# Patient Record
(demographics unavailable — no encounter records)

---

## 2017-11-28 NOTE — DIS
Mr. Alberto underwent transesophageal echo today by Dr. Laurent.  He said there is a communication
 of 2 mm around the Amulet device and spoke with Dr. Arzola.  He recommended not cardioverting the chandrakant
ent at the present time, we will stop aspirin and resume apixaban and Dr. Arzola said he will arrange f
or followup with Dr. Jackson.

## 2017-11-28 NOTE — ECHO
TRANSESOPHAGEAL ECHOCARDIOGRAM:

 

DATE OF PROCEDURE:  11/28/17 

 

INDICATION:

Paroxysmal atrial fibrillation. 

 

DESCRIPTION OF PROCEDURE:

The patient was taken to the PACU. The patient was sedated by

anesthesiology. A transesophageal probe was placed in the distal

esophagus and stomach. Echocardiographic images were obtained. The

transesophageal probe was removed.

 

FINDINGS:

1.  Normal left ventricular systolic function.

2.  Left atrial enlargement.

3.  Normal mitral and aortic valves.

4.  Mild mitral regurgitation.

5.  Mild tricuspid regurgitation.

6.  Occluder device well positioned in the left atrial appendage. A 2.0 mm leak was noted around the 
device. 

7.  Atherosclerotic debris in the descending aorta.  

 

IMPRESSION: 

Small, 2.0 mm, leak noted around the left atrial occluder device.